# Patient Record
Sex: MALE | Race: WHITE | NOT HISPANIC OR LATINO
[De-identification: names, ages, dates, MRNs, and addresses within clinical notes are randomized per-mention and may not be internally consistent; named-entity substitution may affect disease eponyms.]

---

## 2019-12-19 ENCOUNTER — APPOINTMENT (OUTPATIENT)
Dept: ORTHOPEDIC SURGERY | Facility: CLINIC | Age: 49
End: 2019-12-19
Payer: COMMERCIAL

## 2019-12-19 VITALS
HEART RATE: 79 BPM | HEIGHT: 72 IN | BODY MASS INDEX: 37.93 KG/M2 | SYSTOLIC BLOOD PRESSURE: 169 MMHG | DIASTOLIC BLOOD PRESSURE: 118 MMHG | WEIGHT: 280 LBS

## 2019-12-19 DIAGNOSIS — M65.332 TRIGGER FINGER, LEFT MIDDLE FINGER: ICD-10-CM

## 2019-12-19 PROCEDURE — 20550 NJX 1 TENDON SHEATH/LIGAMENT: CPT | Mod: F2

## 2019-12-19 PROCEDURE — 99203 OFFICE O/P NEW LOW 30 MIN: CPT | Mod: 25

## 2020-09-02 ENCOUNTER — TRANSCRIPTION ENCOUNTER (OUTPATIENT)
Age: 50
End: 2020-09-02

## 2022-05-19 ENCOUNTER — NON-APPOINTMENT (OUTPATIENT)
Age: 52
End: 2022-05-19

## 2024-09-30 ENCOUNTER — APPOINTMENT (OUTPATIENT)
Dept: ORTHOPEDIC SURGERY | Facility: CLINIC | Age: 54
End: 2024-09-30
Payer: COMMERCIAL

## 2024-09-30 VITALS
HEART RATE: 74 BPM | DIASTOLIC BLOOD PRESSURE: 84 MMHG | SYSTOLIC BLOOD PRESSURE: 130 MMHG | WEIGHT: 265 LBS | BODY MASS INDEX: 35.89 KG/M2 | HEIGHT: 72 IN

## 2024-09-30 DIAGNOSIS — M25.511 PAIN IN RIGHT SHOULDER: ICD-10-CM

## 2024-09-30 DIAGNOSIS — M25.512 PAIN IN RIGHT SHOULDER: ICD-10-CM

## 2024-09-30 PROCEDURE — 99204 OFFICE O/P NEW MOD 45 MIN: CPT | Mod: 25

## 2024-09-30 PROCEDURE — 73030 X-RAY EXAM OF SHOULDER: CPT | Mod: 50

## 2024-09-30 PROCEDURE — 20611 DRAIN/INJ JOINT/BURSA W/US: CPT | Mod: RT

## 2024-09-30 RX ORDER — MELOXICAM 15 MG/1
15 TABLET ORAL
Qty: 21 | Refills: 0 | Status: ACTIVE | COMMUNITY
Start: 2024-09-30 | End: 1900-01-01

## 2024-09-30 NOTE — PHYSICAL EXAM
[de-identified] : General: Awake, alert, no acute distress, Patient was cooperative and appropriate during the examination.  The patient is overweight for height and age.  Walks without an antalgic gait.   Bilateral Shoulder Exam: Physical exam of the shoulders demonstrates normal skin without signs of skin changes or abnormalities. No erythema, warmth, or joint effusion appreciated.  Sensation intact light touch C5-T1 bilaterally Palpable radial pulses Radial/ulnar/median/axillary/musculocutaneous/AIN/PIN nerves grossly intact  Range of motion: Forward Flexion: 175 bilaterally Abduction: 90 bilaterally External Rotation: 45 bilaterally Internal Rotation: T12 bilaterally  Palpation: Not tender to palpation over the glenohumeral joints Moderately tender palpation over the rotator cuff insertion on the greater tuberosities, worse on the right Not tender to palpation over the AC joints Mildly tender to palpation of the biceps tendon/bicipital grooves  Strength testing: Supraspinatus: 5/5 Infraspinatus: 5/5 Subscapularis: 5/5  Special test: Empty can test positive bilaterally, worse on the right Alfonso impingement test positive bilaterally, worse on the right Speeds test mildly positive bilaterally [place] test negative bilaterally Lift-off test negative bilaterally Bell-press test negative bilaterally Cross-arm adduction test negative bilaterally [de-identified] : X-rays including 4 views of the bilateral shoulders were obtained in the office on 9/30/2024 and reviewed with the patient.  No acute fracture or dislocation.  Mild degenerative changes of the AC joints are noted.  No significant glenohumeral arthritis.  Tiny calcific deposits are noted about the left greater and lesser tuberosities.

## 2024-09-30 NOTE — DISCUSSION/SUMMARY
[de-identified] : Assessment: 54-year-old male with bilateral shoulder pain secondary to rotator cuff tendinitis, worse on the right  Plan: I had a long discussion with the patient today regarding the nature of their diagnosis and treatment plan. We discussed the risks and benefits of no treatment as well as nonoperative and operative treatments.  I reviewed the patient's x-rays today with him in the office which are negative for any acute pathology.  On examination he has good range of motion and strength with positive impingement signs, worse on the right.  At this time I recommend conservative treatment of the patient's condition with modalities including rest, ice, heat, anti-inflammatory medications, activity modifications, and home stretching and strengthening exercises.  A prescription for meloxicam was sent to the patient's pharmacy today.  I discussed with the patient the risks and benefits associated with NSAID use. GI precautions were discussed.  A referral for physical therapy was provided to begin working on exercises to help improve their strength and function.  Additionally, cortisone injection was administered to the patient's right shoulder subacromial space today in the office under sterile conditions.  The patient tolerated this well and there were no adverse events.  Recommend follow-up in 8 weeks repeat clinical assessment.  If symptoms persist we will consider an MRI.  The patient verbalizes their understanding and agrees with the plan.  All questions were answered to their satisfaction.

## 2024-09-30 NOTE — HISTORY OF PRESENT ILLNESS
[de-identified] : 9/30/2024: Silviano is a pleasant 54-year-old right-hand-dominant male presents to the office today for evaluation of bilateral shoulder pain which is worse on his right side.  The patient states that he has had pain in both shoulders on and off for the past 20 years or so.  He states he does not recall any specific injury but he did play sports and football growing up and he may have hurt his shoulders during that time.  His pain has gotten progressively worse over the past couple of months despite no new injury.  The pain is generally activity related and worse with overhead reaching.  The pain hurts when he sleeps at night and occasionally wakes him up, particular on the right side.  He has not had any formal treatment for the shoulders yet.  The patient denies any fevers, chills, sweats, recent illnesses, numbness, tingling, weakness, or pain elsewhere at this time.

## 2024-09-30 NOTE — PROCEDURE
[de-identified] : I injected the patient's right shoulder today with cortisone.  I discussed at length with the patient the planned steroid and lidocaine injection. The risks, benefits, convalescence and alternatives were reviewed. The possible side effects discussed included but were not limited to: pain, swelling, heat, bleeding, and redness. Symptoms are generally mild but if they are extensive then contact the office. Giving pain relievers by mouth such as NSAIDs or Tylenol can generally treat the reactions to steroid and lidocaine. Rare cases of infection have been noted. Rash, hives and itching may occur post injection. If you have muscle pain or cramps, flushing and or swelling of the face, rapid heart beat, nausea, dizziness, fever, chills, headache, difficulty breathing, swelling in the arms or legs, or have a prickly feeling of your skin, contact a health care provider immediately. Following this discussion, the shoulder was prepped with Chlorhexidine and Alcohol and under sterile conditions the 80 mg Depo-Medrol and 4 cc Lidocaine injection was performed with a 22 gauge needle through a posterolateral injection site. The needle was introduced into the subacromial space and the medication was injected. Upon withdrawal of the needle the site was cleaned with alcohol and a band aid was applied. The patient tolerated the injection well and there were no adverse effects. Post injection instructions included no strenuous activity for 24 hours, cryotherapy and if there are any adverse effects to contact the office.